# Patient Record
Sex: FEMALE | Race: WHITE | URBAN - METROPOLITAN AREA
[De-identification: names, ages, dates, MRNs, and addresses within clinical notes are randomized per-mention and may not be internally consistent; named-entity substitution may affect disease eponyms.]

---

## 2019-04-02 ENCOUNTER — OFFICE VISIT (OUTPATIENT)
Dept: ORTHOPEDIC SURGERY | Age: 29
End: 2019-04-02
Payer: COMMERCIAL

## 2019-04-02 VITALS — HEIGHT: 65 IN | WEIGHT: 150 LBS | BODY MASS INDEX: 24.99 KG/M2

## 2019-04-02 DIAGNOSIS — M51.36 LUMBAR DEGENERATIVE DISC DISEASE: Primary | ICD-10-CM

## 2019-04-02 PROCEDURE — 99243 OFF/OP CNSLTJ NEW/EST LOW 30: CPT | Performed by: ORTHOPAEDIC SURGERY

## 2019-04-02 NOTE — PROGRESS NOTES
History of present illness:   Ms. Bebe Victor is a pleasant 34 y.o. female kindly referred by Dr. Burgess Gilmore for second opinion regarding her LBP and left leg pain. She states her pain began about 2 years ago while doing a tire rotation at work. States she felt a \"pop\" in her back. Her pain has been constant since then. She rates her back pain 8/10 and leg pain 6/10. States her back pain is constant and leg pain is more intermittent. The leg pain radiates down the posterior and anterior aspect of her left leg to her foot. Denies any alleviating or aggravating factors. She admits numbness and tingling in her left leg. She denies weakness of her legs and denies bowel or bladder dysfunction. The pain mildly disrupts her sleep. She has tried one epidural injection, physical therapy, and chiropractic care in 2017 without relief. States after her first epidural injection she had complete numbness and weakness of her lower extremities. Past medical history:  Her past medical history has been reviewed. Past surgical history:  Her past surgical history has been reviewed and is non-contributory to her present illness. Her medications and allergies were reviewed. Social history:  Her social history has been reviewed and she is a former smoker. Review of symptoms:  Patient's review of symptoms was reviewed and is significant for difficulty sleeping and night sweats and negative for recent weight loss, fatigue, chills, visual disturbances, blood in stool or urine, recent infection, chest pain, or shortness of breath. Physical examination:  Ms. Bebe Victor most recent vitals:  Vitals  Height: 5' 5\" (165.1 cm)  Weight: 150 lb (68 kg)  Body mass index is 24.96 kg/m². She is well-developed and well-nourished, is in obvious pain and alert and oriented to person, place, and time. She demonstrates appropriate mood and affect. Her skin is warm and dry.  Her gait is antalgic and she walk with her left hip and knee flexed. She stands with slight lumbar flexion. Her lumbar flexion, extension and lateral bending are moderately reduced with pain. She has mild tenderness over her lumbar spine without obvious muscle spasm. The skin over her lumbar spine is normal without a surgical scar. She has 5/5 strength of EHLs, FHLs, foot evertors, ankle dorsiflexors, plantarflexors, quadriceps, hamstrings, iliopsoas, abductors and adductors about the hips bilaterally. She has a negative straight leg raise, bilaterally. Achilles and quadriceps reflexes are 1+. Sensation is intact to light touch L3 to S1 bilaterally. She has no clonus. Hip range of motion painless. Imaging:  I reviewed radiology reports of her MRI images of her lumbar spine. No images available to view. They note L5-S1 degenerative disc disease with mild foraminal stenosis. EMG from 10/12/17 was reviewed. They show no evidence of denervation in bilateral lower extremities. Assessment:  L5-S1 degenerative disc disease    Plan:  We discussed treatment options including observation, physical therapy, and epidural injection. She will bring her MRI images to the office for us to review to determine the appropriate next steps.

## 2019-05-01 ENCOUNTER — TELEPHONE (OUTPATIENT)
Dept: ORTHOPEDIC SURGERY | Age: 29
End: 2019-05-01

## 2019-05-01 NOTE — TELEPHONE ENCOUNTER
WC IW called and would like a return call. She states, she mailed a copy of her previous MRI - Lumbar Spine for Dr. Joesph Landaverde to review per his request on 4/2/19 visit.    Thank you,  Phone # [phone]

## 2019-05-03 ENCOUNTER — TELEPHONE (OUTPATIENT)
Dept: ORTHOPEDIC SURGERY | Age: 29
End: 2019-05-03

## 2019-07-08 NOTE — TELEPHONE ENCOUNTER
Carlos Lacysh this is in regards to our conversation on 5/1/19, 1965 Daniels Gasquet called again today regarding obtaining a note from Dr. Dionicio Mendez regarding his opinion of her MRI from 11/1/17.

## 2019-07-12 NOTE — TELEPHONE ENCOUNTER
Dr. Berny Jacome said that the letter would need to come from Dr. Javad Vieira since he is the treating physician.